# Patient Record
Sex: FEMALE | Race: WHITE | Employment: OTHER | URBAN - METROPOLITAN AREA
[De-identification: names, ages, dates, MRNs, and addresses within clinical notes are randomized per-mention and may not be internally consistent; named-entity substitution may affect disease eponyms.]

---

## 2017-02-14 ENCOUNTER — APPOINTMENT (OUTPATIENT)
Dept: INTERNAL MEDICINE CLINIC | Age: 67
End: 2017-02-14

## 2017-02-14 DIAGNOSIS — Z13.9 SCREENING: ICD-10-CM

## 2017-02-14 DIAGNOSIS — E03.9 HYPOTHYROIDISM, UNSPECIFIED TYPE: ICD-10-CM

## 2017-02-14 DIAGNOSIS — M85.80 OSTEOPENIA: ICD-10-CM

## 2017-02-14 DIAGNOSIS — I10 ESSENTIAL HYPERTENSION: ICD-10-CM

## 2017-02-15 LAB
25(OH)D3+25(OH)D2 SERPL-MCNC: 38.7 NG/ML (ref 30–100)
CHOLEST SERPL-MCNC: 161 MG/DL (ref 100–199)
HCV AB S/CO SERPL IA: <0.1 S/CO RATIO (ref 0–0.9)
HDLC SERPL-MCNC: 62 MG/DL
INTERPRETATION, 910389: NORMAL
LDLC SERPL CALC-MCNC: 86 MG/DL (ref 0–99)
TRIGL SERPL-MCNC: 67 MG/DL (ref 0–149)
TSH SERPL DL<=0.005 MIU/L-ACNC: 0.83 UIU/ML (ref 0.45–4.5)
VLDLC SERPL CALC-MCNC: 13 MG/DL (ref 5–40)

## 2017-02-21 ENCOUNTER — OFFICE VISIT (OUTPATIENT)
Dept: INTERNAL MEDICINE CLINIC | Age: 67
End: 2017-02-21

## 2017-02-21 VITALS
HEIGHT: 64 IN | SYSTOLIC BLOOD PRESSURE: 127 MMHG | DIASTOLIC BLOOD PRESSURE: 76 MMHG | WEIGHT: 180.8 LBS | BODY MASS INDEX: 30.87 KG/M2 | TEMPERATURE: 98 F | RESPIRATION RATE: 16 BRPM | OXYGEN SATURATION: 98 % | HEART RATE: 62 BPM

## 2017-02-21 DIAGNOSIS — I10 ESSENTIAL HYPERTENSION: ICD-10-CM

## 2017-02-21 DIAGNOSIS — Z01.419 WELL WOMAN EXAM: Primary | ICD-10-CM

## 2017-02-21 DIAGNOSIS — M85.80 OSTEOPENIA: ICD-10-CM

## 2017-02-21 DIAGNOSIS — Z13.9 SCREENING: ICD-10-CM

## 2017-02-21 DIAGNOSIS — Z71.89 ADVANCE DIRECTIVE DISCUSSED WITH PATIENT: ICD-10-CM

## 2017-02-21 DIAGNOSIS — E03.9 HYPOTHYROIDISM, UNSPECIFIED TYPE: ICD-10-CM

## 2017-02-21 RX ORDER — LEVOTHYROXINE SODIUM 150 UG/1
TABLET ORAL
Qty: 90 TAB | Refills: 4 | Status: SHIPPED | OUTPATIENT
Start: 2017-02-21 | End: 2018-04-03 | Stop reason: SDUPTHER

## 2017-02-21 NOTE — MR AVS SNAPSHOT
Visit Information Date & Time Provider Department Dept. Phone Encounter #  
 2/21/2017  9:00 AM Alyssa SotoElliot [phone] [id number] Follow-up Instructions Return in about 1 year (around 2/21/2018). Upcoming Health Maintenance Date Due  
 BREAST CANCER SCRN MAMMOGRAM 4/29/2016 PAP AKA CERVICAL CYTOLOGY 7/15/2016 DTaP/Tdap/Td series (2 - Td) 10/8/2018 COLONOSCOPY 1/22/2023 Allergies as of 2/21/2017  Review Complete On: 2/21/2017 By: Alyssa Soto MD  
  
 Severity Noted Reaction Type Reactions Pcn [Penicillins] High 11/17/2010    Hives Sulfa (Sulfonamide Antibiotics) High 11/17/2010    Hives Hydrochlorothiazide  11/17/2010    Other (comments) Fixed drug eruption Lisinopril  11/17/2010    Cough Sertraline  11/17/2010    Other (comments) \"racing thoughts and motivation\" Current Immunizations  Reviewed on 10/31/2013 Name Date Influenza Vaccine 10/15/2014, 10/26/2013 Influenza Vaccine Whole 11/17/2011 TDAP Vaccine 10/8/2008 Zoster Vaccine, Live 10/17/2012 Not reviewed this visit You Were Diagnosed With   
  
 Codes Comments Well woman exam    -  Primary ICD-10-CM: K60.420 ICD-9-CM: V72.31 Advance directive discussed with patient     ICD-10-CM: Z71.89 ICD-9-CM: V65.49 Hypothyroidism, unspecified type     ICD-10-CM: E03.9 ICD-9-CM: 244.9 Essential hypertension     ICD-10-CM: I10 
ICD-9-CM: 401.9 Osteopenia     ICD-10-CM: M85.80 ICD-9-CM: 733.90 Screening     ICD-10-CM: Z13.9 ICD-9-CM: V82.9 Vitals BP Pulse Temp Resp Height(growth percentile) Weight(growth percentile) 127/76 (BP 1 Location: Right arm, BP Patient Position: Sitting) 62 98 °F (36.7 °C) (Oral) 16 5' 4\" (1.626 m) 180 lb 12.8 oz (82 kg) SpO2 BMI OB Status Smoking Status 98% 31.03 kg/m2 Postmenopausal Never Smoker Vitals History BMI and BSA Data Body Mass Index Body Surface Area 31.03 kg/m 2 1.92 m 2 Preferred Pharmacy Pharmacy Name Phone [facility] [place] [place] [phone] Your Updated Medication List  
  
   
This list is accurate as of: 2/21/17  9:40 AM.  Always use your most recent med list.  
  
  
  
  
 chlorpheniramine 4 mg tablet Commonly known as:  CHLOR-TRIMETRON Take 4 mg by mouth every six (6) hours as needed for Allergies. diclofenac 1 % Gel Commonly known as:  VOLTAREN Apply 2 g to affected area four (4) times daily as needed for Pain. EPINEPHrine 0.3 mg/0.3 mL injection Commonly known as:  EPIPEN  
0.3 mL by IntraMUSCular route once as needed for up to 1 dose. levothyroxine 150 mcg tablet Commonly known as:  SYNTHROID  
TAKE ONE TABLET ONCE DAILY  BEFORE BREAKFAST ONE-A-DAY WOMENS FORMULA 27-0.4 mg Tab Generic drug:  multivitamins-ca-iron-minerals Take  by mouth. OYSTER SHELL CALCIUM-VIT D3 250-125 mg-unit tablet Generic drug:  calcium-vitamin D Take 1 Tab by mouth daily. VITAMIN D3 1,000 unit tablet Generic drug:  cholecalciferol Take 1,000 Units by mouth daily. Prescriptions Sent to Pharmacy Refills  
 levothyroxine (SYNTHROID) 150 mcg tablet 4 Sig: TAKE ONE TABLET ONCE DAILY  BEFORE BREAKFAST Class: Normal  
 Pharmacy: [address] [place] [address], [place] Rausch  #: [phone] We Performed the Following METABOLIC PANEL, COMPREHENSIVE [37874 CPT(R)] Follow-up Instructions Return in about 1 year (around 2/21/2018). To-Do List   
 02/21/2017 Imaging:  [facility] MAMMO BI SCREENING INCL CAD Introducing [facility]! Dear Leonard Gandara: 
Thank you for requesting a stiQRd account. Our records indicate that you already have an active stiQRd account. You can access your account anytime at [email or web address]. [email or web address] Did you know that you can access your hospital and ER discharge instructions at any time in Thing5? You can also review all of your test results from your hospital stay or ER visit. Additional Information If you have questions, please visit the Frequently Asked Questions section of the Thing5 website at [email or web address] Remember, Thing5 is NOT to be used for urgent needs. For medical emergencies, dial 911. Now available from your iPhone and Android! Please provide this summary of care documentation to your next provider. Your primary care clinician is listed as Vicky Gerber. If you have any questions after today's visit, please call [phone].

## 2017-02-21 NOTE — PROGRESS NOTES
Patient received paperwork for advance directive during previous visit but has not completed at this time     Reviewed record In preparation for visit and have obtained necessary documentation      1. Have you been to the ER, urgent care clinic since your last visit? Hospitalized since your last visit? NO    2. Have you seen or consulted any other health care providers outside of the [address] [place] [address] since your last visit? Include any pap smears or colon screening. NO      Pt reports she needs to schedule her pap and mammo. Reminded.

## 2017-02-21 NOTE — PROGRESS NOTES
HPI  Ms. Andra Gruber is a 61y.o. year old female, she is seen today for well exam, follow up hypothyroidism. Exercise - gym 3 days per week - aerobic and weights. Also gardening, also home renovations over the weekend. No cp or sob. Not working for about 6 mos. Enjoys being retired. No change in bowels, melena or brbpr. No changes noted on BSE. Chief Complaint   Patient presents with    Complete Physical        Prior to Admission medications    Medication Sig Start Date End Date Taking? Authorizing Provider   levothyroxine (SYNTHROID) 150 mcg tablet TAKE ONE TABLET ONCE DAILY  BEFORE BREAKFAST 2/21/17  Yes Becca Willson MD   diclofenac (VOLTAREN) 1 % topical gel Apply 2 g to affected area four (4) times daily as needed for Pain. 12/3/14  Yes Becca Willson MD   multivitamins-ca-iron-minerals (ONE-A-DAY WOMENS FORMULA) 27-0.4 mg Tab Take  by mouth. Yes Historical Provider   cholecalciferol, vitamin d3, (VITAMIN D) 1,000 unit tablet Take 1,000 Units by mouth daily. Yes Historical Provider   calcium-vitamin D (OYSTER SHELL CALCIUM-VIT D3) 250-125 mg-unit tablet Take 1 Tab by mouth daily. Yes Historical Provider   EPINEPHrine (EPIPEN) 0.3 mg/0.3 mL injection 0.3 mL by IntraMUSCular route once as needed for up to 1 dose. 7/25/16   Becca Willson MD   chlorpheniramine (CHLOR-TRIMETRON) 4 mg tablet Take 4 mg by mouth every six (6) hours as needed for Allergies.     Historical Provider         Allergies   Allergen Reactions    Pcn [Penicillins] Hives    Sulfa (Sulfonamide Antibiotics) Hives    Hydrochlorothiazide Other (comments)     Fixed drug eruption    Lisinopril Cough    Sertraline Other (comments)     \"racing thoughts and motivation\"         REVIEW OF SYSTEMS:  Per HPI    PHYSICAL EXAM:  Visit Vitals    /76 (BP 1 Location: Right arm, BP Patient Position: Sitting)    Pulse 62    Temp 98 °F (36.7 °C) (Oral)    Resp 16    Ht 5' 4\" (1.626 m)    Wt 180 lb 12.8 oz (82 kg)    SpO2 98%    BMI 31.03 kg/m2     Constitutional: Appears well-developed and well-nourished. No distress. HENT:   Head: Normocephalic and atraumatic. Eyes: No scleral icterus. Neck: no lad, no tm, supple   Cardiovascular: Normal S1/S2, regular rhythm. No murmurs, rubs, or gallops. Pulmonary/Chest: Effort normal and breath sounds normal. No respiratory distress. No wheezes, rhonchi, or rales. Breasts: symmetric , no masses, no skin changes, no axillary adenopathy   Abdomen: Soft, NT/ND, +BS, no rebound or guarding, no masses, no HSM appreciated. Ext: No edema. Neurological: Alert. Psychiatric: Normal mood and affect. Behavior is normal.     Lab Results   Component Value Date/Time    Sodium 144 02/21/2017 12:07 PM    Potassium 4.5 02/21/2017 12:07 PM    Chloride 104 02/21/2017 12:07 PM    CO2 26 02/21/2017 12:07 PM    Anion gap 10 04/08/2009 09:55 AM    Glucose 88 02/21/2017 12:07 PM    BUN 17 02/21/2017 12:07 PM    Creatinine 0.81 02/21/2017 12:07 PM    BUN/Creatinine ratio 21 02/21/2017 12:07 PM    GFR est AA 89 02/21/2017 12:07 PM    GFR est non-AA 77 02/21/2017 12:07 PM    Calcium 9.8 02/21/2017 12:07 PM    Bilirubin, total 0.3 02/21/2017 12:07 PM    AST (SGOT) 14 02/21/2017 12:07 PM    Alk.  phosphatase 72 02/21/2017 12:07 PM    Protein, total 6.9 02/21/2017 12:07 PM    Albumin 4.2 02/21/2017 12:07 PM    A-G Ratio 1.6 02/21/2017 12:07 PM    ALT (SGPT) 13 02/21/2017 12:07 PM     No results found for: HBA1C, HGBE8, XWX2DGQW, LLW5EDZF   Lab Results   Component Value Date/Time    Cholesterol, total 161 02/14/2017 08:12 AM    HDL Cholesterol 62 02/14/2017 08:12 AM    LDL, calculated 86 02/14/2017 08:12 AM    VLDL, calculated 13 02/14/2017 08:12 AM    Triglyceride 67 02/14/2017 08:12 AM    CHOL/HDL Ratio 2.4 04/08/2009 09:55 AM          ASSESSMENT/PLAN  Cedriclouie Richamacie was seen today for complete physical.    Diagnoses and all orders for this visit:    Well woman exam  -     [facility] MAMMO BI SCREENING INCL CAD; Future  Encouraged continued exercise, getting pap with gyn  Advance directive discussed with patient    Hypothyroidism, unspecified type  -     levothyroxine (SYNTHROID) 150 mcg tablet; TAKE ONE TABLET ONCE DAILY  BEFORE BREAKFAST  -     METABOLIC PANEL, COMPREHENSIVE  euthyroid  Essential hypertension  Controlled on current regimen, continue   Osteopenia    Screening          Health Maintenance Due   Topic Date Due    BREAST CANCER SCRN MAMMOGRAM  04/29/2016    PAP AKA CERVICAL CYTOLOGY  07/15/2016        Follow-up Disposition:  Return in about 1 year (around 2/21/2018). Reviewed plan of care. Patient has provided input and agrees with goals. The nurse provided the patient and/or family with advanced directive information if needed and encouraged the patient to provide a copy to the office when available.

## 2017-02-21 NOTE — ACP (ADVANCE CARE PLANNING)
Advance Care Planning (ACP) Provider Conversation Snapshot    Date of ACP Conversation: 02/21/17  Persons included in Conversation:  patient  Length of ACP Conversation in minutes:  <16 minutes (Non-Billable)    Authorized Decision Maker (if patient is incapable of making informed decisions): This person is:   Raina Yo -           For Patients with Decision Making Capacity:   Values/Goals: Exploration of values, goals, and preferences if recovery is not expected, even with continued medical treatment in the event of:  Imminent death  Severe, permanent brain injury  \"In these circumstances, what matters most to you? \"  Care focused more on comfort or quality of life.     Conversation Outcomes / Follow-Up Plan:   Recommended communicating the plan and making copies for the healthcare agent, personal physician, and others as appropriate (e.g., health system)

## 2017-02-22 LAB
ALBUMIN SERPL-MCNC: 4.2 G/DL (ref 3.6–4.8)
ALBUMIN/GLOB SERPL: 1.6 {RATIO} (ref 1.1–2.5)
ALP SERPL-CCNC: 72 IU/L (ref 39–117)
ALT SERPL-CCNC: 13 IU/L (ref 0–32)
AST SERPL-CCNC: 14 IU/L (ref 0–40)
BILIRUB SERPL-MCNC: 0.3 MG/DL (ref 0–1.2)
BUN SERPL-MCNC: 17 MG/DL (ref 8–27)
BUN/CREAT SERPL: 21 (ref 11–26)
CALCIUM SERPL-MCNC: 9.8 MG/DL (ref 8.7–10.3)
CHLORIDE SERPL-SCNC: 104 MMOL/L (ref 96–106)
CO2 SERPL-SCNC: 26 MMOL/L (ref 18–29)
CREAT SERPL-MCNC: 0.81 MG/DL (ref 0.57–1)
GLOBULIN SER CALC-MCNC: 2.7 G/DL (ref 1.5–4.5)
GLUCOSE SERPL-MCNC: 88 MG/DL (ref 65–99)
POTASSIUM SERPL-SCNC: 4.5 MMOL/L (ref 3.5–5.2)
PROT SERPL-MCNC: 6.9 G/DL (ref 6–8.5)
SODIUM SERPL-SCNC: 144 MMOL/L (ref 134–144)

## 2017-03-08 ENCOUNTER — OFFICE VISIT (OUTPATIENT)
Dept: DERMATOLOGY | Facility: AMBULATORY SURGERY CENTER | Age: 67
End: 2017-03-08

## 2017-03-08 ENCOUNTER — HOSPITAL ENCOUNTER (OUTPATIENT)
Dept: LAB | Age: 67
Discharge: HOME | End: 2017-03-08

## 2017-03-08 VITALS
BODY MASS INDEX: 30.73 KG/M2 | SYSTOLIC BLOOD PRESSURE: 128 MMHG | HEART RATE: 63 BPM | HEIGHT: 64 IN | TEMPERATURE: 98.3 F | WEIGHT: 180 LBS | DIASTOLIC BLOOD PRESSURE: 84 MMHG | RESPIRATION RATE: 18 BRPM | OXYGEN SATURATION: 97 %

## 2017-03-08 DIAGNOSIS — D18.01 CHERRY ANGIOMA: ICD-10-CM

## 2017-03-08 DIAGNOSIS — Q82.8 POROKERATOSIS: ICD-10-CM

## 2017-03-08 DIAGNOSIS — D22.9 MULTIPLE BENIGN NEVI: ICD-10-CM

## 2017-03-08 DIAGNOSIS — L82.1 SEBORRHEIC KERATOSES: ICD-10-CM

## 2017-03-08 DIAGNOSIS — Z80.8 FAMILY HISTORY OF NONMELANOMA SKIN CANCER: ICD-10-CM

## 2017-03-08 DIAGNOSIS — D48.5 NEOPLASM OF UNCERTAIN BEHAVIOR OF SKIN OF AXILLA: ICD-10-CM

## 2017-03-08 DIAGNOSIS — L91.8 CUTANEOUS SKIN TAGS: Primary | ICD-10-CM

## 2017-03-08 DIAGNOSIS — Z80.8 FAMILY HISTORY OF MELANOMA: ICD-10-CM

## 2017-03-08 NOTE — MR AVS SNAPSHOT
Visit Information Date & Time Provider Department Dept. Phone Encounter #  
 3/8/2017  1:15 PM Rachael Ramos NP [facility] [id number]  Your Appointments 3/8/2017  1:15 PM  
Any with Rachael Ramos NP [address] [phone] [facility]) Appt Note: est pt; Mole changed (dark) located under left arm. [facility] A Yvone Jessica [address] [address] Upcoming Health Maintenance Date Due  
 BREAST CANCER SCRN MAMMOGRAM 4/29/2016 PAP AKA CERVICAL CYTOLOGY 7/15/2016 DTaP/Tdap/Td series (2 - Td) 10/8/2018 COLONOSCOPY 1/22/2023 Allergies as of 3/8/2017  Review Complete On: 3/8/2017 By: Quin Cook LPN Severity Noted Reaction Type Reactions Pcn [Penicillins] High 11/17/2010    Hives Sulfa (Sulfonamide Antibiotics) High 11/17/2010    Hives Hydrochlorothiazide  11/17/2010    Other (comments) Fixed drug eruption Lisinopril  11/17/2010    Cough Sertraline  11/17/2010    Other (comments) \"racing thoughts and motivation\" Current Immunizations  Reviewed on 10/31/2013 Name Date Influenza Vaccine 10/15/2014, 10/26/2013 Influenza Vaccine Whole 11/17/2011 TDAP Vaccine 10/8/2008 Zoster Vaccine, Live 10/17/2012 Not reviewed this visit Vitals BP Pulse Temp Resp Height(growth percentile) Weight(growth percentile) 128/84 (BP 1 Location: Right arm, BP Patient Position: Sitting) 63 98.3 °F (36.8 °C) (Oral) 18 5' 4\" (1.626 m) 180 lb (81.6 kg) SpO2 BMI OB Status Smoking Status 97% 30.9 kg/m2 Postmenopausal Never Smoker BMI and BSA Data Body Mass Index Body Surface Area 30.9 kg/m 2 1.92 m 2 Preferred Pharmacy Pharmacy Name Phone [facility] [place] [place] [phone] Your Updated Medication List  
  
   
This list is accurate as of: 3/8/17  1:08 PM.  Always use your most recent med list.  
  
  
  
  
 chlorpheniramine 4 mg tablet Commonly known as:  CHLOR-TRIMETRON Take 4 mg by mouth every six (6) hours as needed for Allergies. diclofenac 1 % Gel Commonly known as:  VOLTAREN Apply 2 g to affected area four (4) times daily as needed for Pain. EPINEPHrine 0.3 mg/0.3 mL injection Commonly known as:  EPIPEN  
0.3 mL by IntraMUSCular route once as needed for up to 1 dose. levothyroxine 150 mcg tablet Commonly known as:  SYNTHROID  
TAKE ONE TABLET ONCE DAILY  BEFORE BREAKFAST ONE-A-DAY WOMENS FORMULA 27-0.4 mg Tab Generic drug:  multivitamins-ca-iron-minerals Take  by mouth. OYSTER SHELL CALCIUM-VIT D3 250-125 mg-unit tablet Generic drug:  calcium-vitamin D Take 1 Tab by mouth daily. VITAMIN D3 1,000 unit tablet Generic drug:  cholecalciferol Take 1,000 Units by mouth daily. To-Do List   
 03/20/2017 3:00 PM  
  [facility] with [facility] at [address] [place] [address] ([phone]) Shower or bathe using soap and water. Do not use deodorant, powder, perfumes, or lotion the day of your exam.  If your prior mammograms were not performed at [facility] please bring films with you or forward prior images 2 days before your procedure. Check in at registration 15min before your appointment time unless you were instructed to do otherwise. A script is not necessary, but if you have one, please bring it on the day of the mammogram or have it faxed to the department. [facility] [phone] [facility] [place]  [phone] [address] [place] [address]  [phone] [facility] [phone] [facility] 1[address] [facility] [phone] \[facility] [place] [facility]\"" & [facility] SERVICES! Dear Tosha Waldron: 
Thank you for requesting a StartSampling account. Our records indicate that you already have an active StartSampling account.   You can access your account anytime at [email or web address]/[employer] Did you know that you can access your hospital and ER discharge instructions at any time in [employer]? You can also review all of your test results from your hospital stay or ER visit. Additional Information If you have questions, please visit the Frequently Asked Questions section of the [employer] website at [email or web address] Remember, [employer] is NOT to be used for urgent needs. For medical emergencies, dial 911. Now available from your iPhone and Android! Please provide this summary of care documentation to your next provider. Your primary care clinician is listed as Vicky Gerber. If you have any questions after today's visit, please call [phone].
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
## 2017-03-20 ENCOUNTER — HOSPITAL ENCOUNTER (OUTPATIENT)
Dept: MAMMOGRAPHY | Age: 67
Discharge: HOME | End: 2017-03-20
Attending: INTERNAL MEDICINE
Payer: COMMERCIAL

## 2017-03-20 DIAGNOSIS — Z01.419 WELL WOMAN EXAM: ICD-10-CM

## 2017-03-20 PROCEDURE — 77067 SCR MAMMO BI INCL CAD: CPT

## 2017-08-07 ENCOUNTER — OFFICE VISIT (OUTPATIENT)
Dept: INTERNAL MEDICINE CLINIC | Age: 67
End: 2017-08-07

## 2017-08-07 VITALS
TEMPERATURE: 98.1 F | WEIGHT: 187.6 LBS | BODY MASS INDEX: 32.03 KG/M2 | HEIGHT: 64 IN | OXYGEN SATURATION: 97 % | SYSTOLIC BLOOD PRESSURE: 127 MMHG | DIASTOLIC BLOOD PRESSURE: 77 MMHG | HEART RATE: 60 BPM | RESPIRATION RATE: 16 BRPM

## 2017-08-07 DIAGNOSIS — M85.80 OSTEOPENIA, UNSPECIFIED LOCATION: ICD-10-CM

## 2017-08-07 DIAGNOSIS — E03.9 HYPOTHYROIDISM, UNSPECIFIED TYPE: ICD-10-CM

## 2017-08-07 DIAGNOSIS — H26.9 CATARACT OF BOTH EYES, UNSPECIFIED CATARACT TYPE: Primary | ICD-10-CM

## 2017-08-07 DIAGNOSIS — M85.9 DISORDER OF BONE DENSITY AND STRUCTURE, UNSPECIFIED: ICD-10-CM

## 2017-08-07 DIAGNOSIS — G47.33 OSA (OBSTRUCTIVE SLEEP APNEA): ICD-10-CM

## 2017-08-07 RX ORDER — KETOROLAC TROMETHAMINE 5 MG/ML
SOLUTION OPHTHALMIC
COMMUNITY
Start: 2017-07-26 | End: 2018-07-23 | Stop reason: ALTCHOICE

## 2017-08-07 RX ORDER — OFLOXACIN 3 MG/ML
SOLUTION/ DROPS OPHTHALMIC
COMMUNITY
Start: 2017-07-26 | End: 2018-07-23 | Stop reason: ALTCHOICE

## 2017-08-07 NOTE — PROGRESS NOTES
HPI  Ms. Joni Hameed is a 74y.o. year old female, she is seen today for pre op prior to cataract surgery on 8/9 and 8/16. In usual state of health. METS >4. No cp, sob, cough, no n/v/abd pain. No f/c.  Very active. Chief Complaint   Patient presents with    Pre-op Exam     Room 1// NON fasting // cataract surgery 8/9 and 8/16 w/ Dr Helen Renteria        Prior to Admission medications    Medication Sig Start Date End Date Taking? Authorizing Provider   ketorolac (ACULAR) 0.5 % ophthalmic solution  7/26/17  Yes Historical Provider   ofloxacin (FLOXIN) 0.3 % ophthalmic solution  7/26/17  Yes Historical Provider   levothyroxine (SYNTHROID) 150 mcg tablet TAKE ONE TABLET ONCE DAILY  BEFORE BREAKFAST 2/21/17  Yes Luis A Montenegro MD   chlorpheniramine (CHLOR-TRIMETRON) 4 mg tablet Take 4 mg by mouth every six (6) hours as needed for Allergies. Yes Historical Provider   diclofenac (VOLTAREN) 1 % topical gel Apply 2 g to affected area four (4) times daily as needed for Pain. 12/3/14  Yes Luis A Montenegro MD   multivitamins-ca-iron-minerals (ONE-A-DAY WOMENS FORMULA) 27-0.4 mg Tab Take  by mouth. Yes Historical Provider   cholecalciferol, vitamin d3, (VITAMIN D) 1,000 unit tablet Take 1,000 Units by mouth daily. Yes Historical Provider   calcium-vitamin D (OYSTER SHELL CALCIUM-VIT D3) 250-125 mg-unit tablet Take 1 Tab by mouth daily. Yes Historical Provider   EPINEPHrine (EPIPEN) 0.3 mg/0.3 mL injection 0.3 mL by IntraMUSCular route once as needed for up to 1 dose.  7/25/16   Luis A Montenegro MD         Allergies   Allergen Reactions    Pcn [Penicillins] Hives    Sulfa (Sulfonamide Antibiotics) Hives    Hydrochlorothiazide Other (comments)     Fixed drug eruption    Lisinopril Cough    Sertraline Other (comments)     \"racing thoughts and motivation\"         REVIEW OF SYSTEMS:  Per HPI    PHYSICAL EXAM:  Visit Vitals    /77 (BP 1 Location: Right arm, BP Patient Position: Sitting)    Pulse 60    Temp 98.1 °F (36.7 °C) (Oral)    Resp 16    Ht 5' 4\" (1.626 m)    Wt 187 lb 9.6 oz (85.1 kg)    LMP  (LMP Unknown)    SpO2 97%    BMI 32.2 kg/m2     Constitutional: Appears well-developed and well-nourished. No distress. HENT:   Head: Normocephalic and atraumatic. Eyes: No scleral icterus. PERRL  Ears: tm's wnl   Mouth: OP clear without lesions, no pharyngeal exudate  Neck: no lad, no tm, supple   Cardiovascular: Normal S1/S2, regular rhythm. No murmurs, rubs, or gallops. Pulmonary/Chest: Effort normal and breath sounds normal. No respiratory distress. No wheezes, rhonchi, or rales. Abdomen: Soft, NT/ND, +BS, no rebound or guarding, no masses, no HSM appreciated. Ext: No edema. Neurological: Alert. Psychiatric: Normal mood and affect. Behavior is normal.     Lab Results   Component Value Date/Time    Sodium 144 02/21/2017 12:07 PM    Potassium 4.5 02/21/2017 12:07 PM    Chloride 104 02/21/2017 12:07 PM    CO2 26 02/21/2017 12:07 PM    Anion gap 10 04/08/2009 09:55 AM    Glucose 88 02/21/2017 12:07 PM    BUN 17 02/21/2017 12:07 PM    Creatinine 0.81 02/21/2017 12:07 PM    BUN/Creatinine ratio 21 02/21/2017 12:07 PM    GFR est AA 89 02/21/2017 12:07 PM    GFR est non-AA 77 02/21/2017 12:07 PM    Calcium 9.8 02/21/2017 12:07 PM    Bilirubin, total 0.3 02/21/2017 12:07 PM    AST (SGOT) 14 02/21/2017 12:07 PM    Alk.  phosphatase 72 02/21/2017 12:07 PM    Protein, total 6.9 02/21/2017 12:07 PM    Albumin 4.2 02/21/2017 12:07 PM    A-G Ratio 1.6 02/21/2017 12:07 PM    ALT (SGPT) 13 02/21/2017 12:07 PM     No results found for: HBA1C, HGBE8, BCJ4ENGM, EUZ0OFGE   Lab Results   Component Value Date/Time    Cholesterol, total 161 02/14/2017 08:12 AM    HDL Cholesterol 62 02/14/2017 08:12 AM    LDL, calculated 86 02/14/2017 08:12 AM    VLDL, calculated 13 02/14/2017 08:12 AM    Triglyceride 67 02/14/2017 08:12 AM    CHOL/HDL Ratio 2.4 04/08/2009 09:55 AM          ASSESSMENT/PLAN  Diagnoses and all orders for this visit:      Based on ACC/AHA guidelines patient may proceed to OR for cataract surgery. 1. Cataract of both eyes, unspecified cataract type    2. Hypothyroidism, unspecified type    3. JEMIMA (obstructive sleep apnea)    4. Osteopenia, unspecified location    5. Disorder of bone density and structure, unspecified   -     DEXA BONE DENSITY STUDY AXIAL; Future          Health Maintenance Due   Topic Date Due    OSTEOPOROSIS SCREENING (DEXA)  07/04/2017    MEDICARE YEARLY EXAM  07/04/2017        Follow-up Disposition: Not on File       Reviewed plan of care. Patient has provided input and agrees with goals. The nurse provided the patient and/or family with advanced directive information if needed and encouraged the patient to provide a copy to the office when available.

## 2017-08-07 NOTE — PROGRESS NOTES
Jadiel Dangelo    Chief Complaint   Patient presents with    Pre-op Exam     Room 1// NON fasting // cataract surgery 8/9 and 8/16 w/ Dr Yuriy Choe       1. Have you been to the ER, urgent care clinic since your last visit? Hospitalized since your last visit? NO    2. Have you seen or consulted any other health care providers outside of the [address] [place] [address] since your last visit? Include any pap smears or colon screening.  NO      Dr Maldonado Savers notified of reason for visit and vitals        Patient received paperwork for advance directive during previous visit but has not completed at this time     Reviewed record In preparation for visit, huddled with provider and have obtained necessary documentation

## 2017-08-07 NOTE — MR AVS SNAPSHOT
Visit Information Date & Time Provider Department Dept. Phone Encounter #  
 8/7/2017  8:00 AM Madison Calhoun MD David Lomax [phone] [id number] Follow-up Instructions Return in about 6 months (around 2/7/2018) for AWV. Upcoming Health Maintenance Date Due OSTEOPOROSIS SCREENING (DEXA) 7/4/2017 MEDICARE YEARLY EXAM 7/4/2017 Pneumococcal 65+ Low/Medium Risk (2 of 2 - PPSV23) 8/7/2018 DTaP/Tdap/Td series (2 - Td) 10/8/2018 GLAUCOMA SCREENING Q2Y 12/9/2018 BREAST CANCER SCRN MAMMOGRAM 3/20/2019 COLONOSCOPY 1/22/2023 Allergies as of 8/7/2017  Review Complete On: 8/7/2017 By: Madison Calhoun MD  
  
 Severity Noted Reaction Type Reactions Pcn [Penicillins] High 11/17/2010    Hives Sulfa (Sulfonamide Antibiotics) High 11/17/2010    Hives Hydrochlorothiazide  11/17/2010    Other (comments) Fixed drug eruption Lisinopril  11/17/2010    Cough Sertraline  11/17/2010    Other (comments) \"racing thoughts and motivation\" Current Immunizations  Reviewed on 10/31/2013 Name Date Influenza Vaccine 10/15/2014, 10/26/2013 Influenza Vaccine Whole 11/17/2011 TDAP Vaccine 10/8/2008 Zoster Vaccine, Live 10/17/2012 Not reviewed this visit You Were Diagnosed With   
  
 Codes Comments Cataract of both eyes, unspecified cataract type    -  Primary ICD-10-CM: H26.9 ICD-9-CM: 366.9 Hypothyroidism, unspecified type     ICD-10-CM: E03.9 ICD-9-CM: 244.9 JEMIMA (obstructive sleep apnea)     ICD-10-CM: G47.33 
ICD-9-CM: 327.23 Osteopenia, unspecified location     ICD-10-CM: M85.80 ICD-9-CM: 733.90 Disorder of bone density and structure, unspecified     ICD-10-CM: M85.9 ICD-9-CM: 733.90 Vitals BP Pulse Temp Resp Height(growth percentile) Weight(growth percentile)  127/77 (BP 1 Location: Right arm, BP Patient Position: Sitting) 60 98.1 °F (36.7 °C) (Oral) 16 5' 4\" (1.626 m) 187 lb 9.6 oz (85.1 kg) LMP SpO2 BMI OB Status Smoking Status (LMP Unknown) 97% 32.2 kg/m2 Postmenopausal Never Smoker Vitals History BMI and BSA Data Body Mass Index Body Surface Area  
 32.2 kg/m 2 1.96 m 2 Preferred Pharmacy Pharmacy Name Phone [facility] - [facility] [phone] Your Updated Medication List  
  
   
This list is accurate as of: 8/7/17  8:26 AM.  Always use your most recent med list.  
  
  
  
  
 chlorpheniramine 4 mg tablet Commonly known as:  CHLOR-TRIMETRON Take 4 mg by mouth every six (6) hours as needed for Allergies. diclofenac 1 % Gel Commonly known as:  VOLTAREN Apply 2 g to affected area four (4) times daily as needed for Pain. EPINEPHrine 0.3 mg/0.3 mL injection Commonly known as:  EPIPEN  
0.3 mL by IntraMUSCular route once as needed for up to 1 dose. ketorolac 0.5 % ophthalmic solution Commonly known as:  ACULAR  
  
 levothyroxine 150 mcg tablet Commonly known as:  SYNTHROID  
TAKE ONE TABLET ONCE DAILY  BEFORE BREAKFAST  
  
 ofloxacin 0.3 % ophthalmic solution Commonly known as:  FLOXIN  
  
 ONE-A-DAY WOMENS FORMULA 27-0.4 mg Tab Generic drug:  multivitamins-ca-iron-minerals Take  by mouth. OYSTER SHELL CALCIUM-VIT D3 250-125 mg-unit tablet Generic drug:  calcium-vitamin D Take 1 Tab by mouth daily. VITAMIN D3 1,000 unit tablet Generic drug:  cholecalciferol Take 1,000 Units by mouth daily. Follow-up Instructions Return in about 6 months (around 2/7/2018) for AWV. To-Do List   
 08/31/2017 Imaging:  DEXA BONE DENSITY STUDY AXIAL Referral Information Referral ID Referred By Referred To  
  
 [id number] Lillie Funes Not Available Visits Status Start Date End Date 1 New Request 8/7/17 8/7/18 If your referral has a status of pending review or denied, additional information will be sent to support the outcome of this decision. Introducing [facility] SERVICES! Dear Aleksandr Stark: 
Thank you for requesting a NaviExpert account. Our records indicate that you already have an active NaviExpert account. You can access your account anytime at [email or web address] Did you know that you can access your hospital and ER discharge instructions at any time in NaviExpert? You can also review all of your test results from your hospital stay or ER visit. Additional Information If you have questions, please visit the Frequently Asked Questions section of the NaviExpert website at [email or web address] Remember, NaviExpert is NOT to be used for urgent needs. For medical emergencies, dial 911. Now available from your iPhone and Android! Please provide this summary of care documentation to your next provider. Your primary care clinician is listed as Vicky Gerber. If you have any questions after today's visit, please call [phone].

## 2017-08-25 ENCOUNTER — TELEPHONE (OUTPATIENT)
Dept: INTERNAL MEDICINE CLINIC | Age: 67
End: 2017-08-25

## 2017-08-25 DIAGNOSIS — M89.9 DISORDER OF BONE AND CARTILAGE: Primary | ICD-10-CM

## 2017-08-25 DIAGNOSIS — M94.9 DISORDER OF BONE AND CARTILAGE: Primary | ICD-10-CM

## 2017-08-25 NOTE — TELEPHONE ENCOUNTER
Macarena Nowak from [address] [place] [address] stated that diagnosis code for DEXA Scan does not work for [facility]. Can you please change the code, so that she can schedule patient. She stated there needs to be a brand new one entered.

## 2017-09-11 ENCOUNTER — HOSPITAL ENCOUNTER (OUTPATIENT)
Dept: MAMMOGRAPHY | Age: 67
Discharge: HOME | End: 2017-09-11
Attending: INTERNAL MEDICINE
Payer: MEDICARE

## 2017-09-11 DIAGNOSIS — M89.9 DISORDER OF BONE AND CARTILAGE: ICD-10-CM

## 2017-09-11 DIAGNOSIS — M94.9 DISORDER OF BONE AND CARTILAGE: ICD-10-CM

## 2017-09-11 PROCEDURE — 77080 DXA BONE DENSITY AXIAL: CPT

## 2018-04-03 DIAGNOSIS — E03.9 HYPOTHYROIDISM, UNSPECIFIED TYPE: ICD-10-CM

## 2018-04-03 RX ORDER — LEVOTHYROXINE SODIUM 150 UG/1
TABLET ORAL
Qty: 90 TAB | Refills: 0 | Status: SHIPPED | OUTPATIENT
Start: 2018-04-03 | End: 2018-07-09 | Stop reason: SDUPTHER

## 2018-04-18 ENCOUNTER — HOSPITAL ENCOUNTER (OUTPATIENT)
Dept: MAMMOGRAPHY | Age: 68
Discharge: HOME | End: 2018-04-18
Attending: INTERNAL MEDICINE
Payer: MEDICARE

## 2018-04-18 DIAGNOSIS — Z12.39 BREAST SCREENING: ICD-10-CM

## 2018-04-18 PROCEDURE — 77067 SCR MAMMO BI INCL CAD: CPT

## 2018-07-09 DIAGNOSIS — E03.9 HYPOTHYROIDISM, UNSPECIFIED TYPE: ICD-10-CM

## 2018-07-09 RX ORDER — LEVOTHYROXINE SODIUM 150 UG/1
TABLET ORAL
Qty: 90 TAB | Refills: 0 | Status: SHIPPED | OUTPATIENT
Start: 2018-07-09 | End: 2018-10-16 | Stop reason: SDUPTHER

## 2018-07-09 NOTE — TELEPHONE ENCOUNTER
Per Kamilaera:    Dr. Bear Crowder Refill  Received: Today       Jyoti MIRANDA [facility] Front Office                     Pt equested a refill of her Rx \"Levothyroxine 150mg\" to be sent to the [facility] on file. Best contact [phone].

## 2018-07-23 ENCOUNTER — OFFICE VISIT (OUTPATIENT)
Dept: INTERNAL MEDICINE CLINIC | Facility: CLINIC | Age: 68
End: 2018-07-23

## 2018-07-23 VITALS
HEIGHT: 64 IN | OXYGEN SATURATION: 96 % | SYSTOLIC BLOOD PRESSURE: 135 MMHG | DIASTOLIC BLOOD PRESSURE: 80 MMHG | TEMPERATURE: 98.1 F | WEIGHT: 181.8 LBS | RESPIRATION RATE: 16 BRPM | HEART RATE: 61 BPM | BODY MASS INDEX: 31.04 KG/M2

## 2018-07-23 DIAGNOSIS — Z71.89 ADVANCED DIRECTIVES, COUNSELING/DISCUSSION: ICD-10-CM

## 2018-07-23 DIAGNOSIS — Z13.39 SCREENING FOR ALCOHOLISM: ICD-10-CM

## 2018-07-23 DIAGNOSIS — E03.9 HYPOTHYROIDISM, UNSPECIFIED TYPE: ICD-10-CM

## 2018-07-23 DIAGNOSIS — Z00.00 MEDICARE ANNUAL WELLNESS VISIT, SUBSEQUENT: Primary | ICD-10-CM

## 2018-07-23 DIAGNOSIS — Z13.220 SCREENING FOR CHOLESTEROL LEVEL: ICD-10-CM

## 2018-07-23 DIAGNOSIS — Z13.31 SCREENING FOR DEPRESSION: ICD-10-CM

## 2018-07-23 NOTE — MR AVS SNAPSHOT
[address] [address] [phone] Patient: Stewart Bailon MRN: [id number] Visit Information Date & Time Provider Department Dept. Phone Encounter #  
 2018  2:30 PM Amadeo Liao MD [employer] [facility]  Follow-up Instructions Return in about 1 year (around 2019) for well exam, thyroid. Upcoming Health Maintenance Date Due Influenza Age 5 to Adult 2018 Pneumococcal 65+ Low/Medium Risk (2 of 2 - PPSV23) 2018 DTaP/Tdap/Td series (2 - Td) 10/8/2018 GLAUCOMA SCREENING Q2Y 2018 BREAST CANCER SCRN MAMMOGRAM 2020 COLONOSCOPY 2023 Allergies as of 2018  Review Complete On: 2018 By: Amadeo Liao MD  
  
 Severity Noted Reaction Type Reactions Pcn [Penicillins] High 2010    Hives Sulfa (Sulfonamide Antibiotics) High 2010    Hives Hydrochlorothiazide  2010    Other (comments) Fixed drug eruption Lisinopril  2010    Cough Sertraline  2010    Other (comments) \"racing thoughts and motivation\" Current Immunizations  Reviewed on 10/31/2013 Name Date Influenza Vaccine 10/15/2014, 10/26/2013 Influenza Vaccine Whole 2011 TDAP Vaccine 10/8/2008 Zoster Vaccine, Live 10/17/2012 Not reviewed this visit You Were Diagnosed With   
  
 Codes Comments Medicare annual wellness visit, subsequent    -  Primary ICD-10-CM: Z00.00 ICD-9-CM: V70.0 Hypothyroidism, unspecified type     ICD-10-CM: E03.9 ICD-9-CM: 244.9 Advanced directives, counseling/discussion     ICD-10-CM: Z71.89 ICD-9-CM: V65.49 Screening for alcoholism     ICD-10-CM: Z13.89 ICD-9-CM: V79.1 Screening for depression     ICD-10-CM: Z13.89 ICD-9-CM: V79.0 Screening for cholesterol level     ICD-10-CM: Z13.220 ICD-9-CM: V77.91 Vitals BP Pulse Temp Resp Height(growth percentile) Weight(growth percentile) 135/80 (BP 1 Location: Left arm, BP Patient Position: Sitting) 61 98.1 °F (36.7 °C) (Oral) 16 5' 4\" (1.626 m) 181 lb 12.8 oz (82.5 kg) LMP SpO2 BMI OB Status Smoking Status (LMP Unknown) 96% 31.21 kg/m2 Postmenopausal Never Smoker Vitals History BMI and BSA Data Body Mass Index Body Surface Area  
 31.21 kg/m 2 1.93 m 2 Preferred Pharmacy Pharmacy Name Phone [facility], M[facility] [place]ausch [phone] Your Updated Medication List  
  
   
This list is accurate as of 7/23/18  3:03 PM.  Always use your most recent med list.  
  
  
  
  
 chlorpheniramine 4 mg tablet Commonly known as:  CHLOR-TRIMETRON Take 4 mg by mouth every six (6) hours as needed for Allergies. diclofenac 1 % Gel Commonly known as:  VOLTAREN Apply 2 g to affected area four (4) times daily as needed for Pain. EPINEPHrine 0.3 mg/0.3 mL injection Commonly known as:  EPIPEN  
0.3 mL by IntraMUSCular route once as needed for up to 1 dose. levothyroxine 150 mcg tablet Commonly known as:  SYNTHROID  
TAKE ONE TABLET ONCE DAILY  BEFORE BREAKFAST ONE-A-DAY WOMENS FORMULA 27-0.4 mg Tab Generic drug:  multivitamins-ca-iron-minerals Take  by mouth. OYSTER SHELL CALCIUM-VIT D3 250-125 mg-unit tablet Generic drug:  calcium-vitamin D Take 1 Tab by mouth daily. VITAMIN D3 1,000 unit tablet Generic drug:  cholecalciferol Take 1,000 Units by mouth daily. We Performed the Following [address] [[id number] [facility]] LIPID PANEL [92773 CPT(R)] METABOLIC PANEL, COMPREHENSIVE [56194 CPT(R)] SC ANNUAL ALCOHOL SCREEN 15 MIN [id number] [facility]] TSH RFX ON ABNORMAL TO FREE T4 [[id number] Custom] Follow-up Instructions Return in about 1 year (around 7/23/2019) for well exam, thyroid. Patient Instructions Medicare Wellness Visit, Female The best way to live healthy is to have a lifestyle where you eat a well-balanced diet, exercise regularly, limit alcohol use, and quit all forms of tobacco/nicotine, if applicable. Regular preventive services are another way to keep healthy. Preventive services (vaccines, screening tests, monitoring & exams) can help personalize your care plan, which helps you manage your own care. Screening tests can find health problems at the earliest stages, when they are easiest to treat. [facility] follows the current, evidence-based guidelines published by the [facility] ([facility]) when recommending preventive services for our patients. Because we follow these guidelines, sometimes recommendations change over time as research supports it. (For example, mammograms used to be recommended annually. Even though Medicare will still pay for an annual mammogram, the newer guidelines recommend a mammogram every two years for women of average risk.) Of course, you and your provider may decide to screen more often for some diseases, based on your risk and co-morbidities (chronic disease you are already diagnosed with). Preventive services for you include: - Medicare offers their members a free annual wellness visit, which is time for you and your primary care provider to discuss and plan for your preventive service needs. Take advantage of this benefit every year! 
 
-All people over age 72 should receive the recommended pneumonia vaccines. Current USPSTF guidelines recommend a series of two vaccines for the best pneumonia protection.  
 
-All adults should have a yearly flu vaccine and a tetanus vaccine every 10 years.  All adults age 61 years should receive a shingles vaccine once in their lifetime.   
 
-A bone mass density test is recommended when a woman turns 65 to screen for osteoporosis. This test is only recommended once as a screening. Some providers will use this same test as a disease monitoring tool if you already have osteoporosis. -All adults age 38-68 years who are overweight should have a diabetes screening test once every three years.  
 
-Other screening tests & preventive services for persons with diabetes include: an eye exam to screen for diabetic retinopathy, a kidney function test, a foot exam, and stricter control over your cholesterol.  
 
-Cardiovascular screening for adults with routine risk involves an electrocardiogram (ECG) at intervals determined by the provider.  
 
-Colorectal cancer screenings should be done for adults age 54-65 years with normal risk. There are a number of acceptable methods of screening for this type of cancer. Each test has its own benefits and drawbacks. Discuss with your provider what is most appropriate for you during your annual wellness visit. The different tests include: colonoscopy (considered the best screening method), a fecal occult blood test, a fecal DNA test, and sigmoidoscopy. -Breast cancer screenings are recommended every other year for women of normal risk age 54-69 years.  
 
-Cervical cancer screenings for women over age 72 are only recommended with certain risk factors.  
 
-All adults born between [facility] should be screened once for Hepatitis C. Here is a list of your current Health Maintenance items (your personalized list of preventive services) with a due date: There are no preventive care reminders to display for this patient. Introducing [facility] SERVICES! Dear Leelee Charles: 
Thank you for requesting a InviteDEV account. Our records indicate that you already have an active InviteDEV account. You can access your account anytime at [email or web address] Did you know that you can access your hospital and ER discharge instructions at any time in [employer]? You can also review all of your test results from your hospital stay or ER visit. Additional Information If you have questions, please visit the Frequently Asked Questions section of the [employer] website at [email or web address] Remember, [employer] is NOT to be used for urgent needs. For medical emergencies, dial 911. Now available from your iPhone and Android! Please provide this summary of care documentation to your next provider. Your primary care clinician is listed as Vicky Gerber. If you have any questions after today's visit, please call [phone].

## 2018-07-23 NOTE — ACP (ADVANCE CARE PLANNING)
Advance Care Planning (ACP) Provider Conversation Snapshot    Date of ACP Conversation: 07/23/18  Persons included in Conversation:  patient  Length of ACP Conversation in minutes:  <16 minutes (Non-Billable)    Authorized Decision Maker (if patient is incapable of making informed decisions): This person is:   Devyn Swartz -           For Patients with Decision Making Capacity:   Values/Goals: Exploration of values, goals, and preferences if recovery is not expected, even with continued medical treatment in the event of:  Imminent death  Severe, permanent brain injury  \"In these circumstances, what matters most to you? \"  Care focused more on comfort or quality of life.     Conversation Outcomes / Follow-Up Plan:   Recommended completion of Advance Directive form after review of ACP materials and conversation with prospective healthcare agent

## 2018-07-23 NOTE — PROGRESS NOTES
Jada Wagoner  Identified pt with two pt identifiers(name and ). Chief Complaint   Patient presents with    [facility]     Room . Have you been to the ER, urgent care clinic since your last visit? Hospitalized since your last visit? NO    2. Have you seen or consulted any other health care providers outside of the [address] [place] [address] since your last visit? Include any pap smears or colon screening. NO      Provider notified of reason for visit, vitals and flowsheets obtained on patients. Patient received paperwork for advance directive during previous visit but has not completed at this time     Reviewed record In preparation for visit, huddled with provider and have obtained necessary documentation      There are no preventive care reminders to display for this patient.     Wt Readings from Last 3 Encounters:   18 181 lb 12.8 oz (82.5 kg)   17 187 lb 9.6 oz (85.1 kg)   17 180 lb (81.6 kg)     Temp Readings from Last 3 Encounters:   17 98.1 °F (36.7 °C) (Oral)   17 98.3 °F (36.8 °C) (Oral)   17 98 °F (36.7 °C) (Oral)     BP Readings from Last 3 Encounters:   17 127/77   17 128/84   17 127/76     Pulse Readings from Last 3 Encounters:   17 60   17 63   17 62     Vitals:    18 1428   Resp: 16   Weight: 181 lb 12.8 oz (82.5 kg)   Height: 5' 4\" (1.626 m)         Learning Assessment:  :     Learning Assessment 3/8/2017 3/14/2016 12/3/2014   PRIMARY LEARNER Patient Patient Patient   HIGHEST LEVEL OF EDUCATION - PRIMARY LEARNER  - - > 4 YEARS OF COLLEGE   BARRIERS PRIMARY LEARNER NONE - NONE   CO-LEARNER CAREGIVER No - -   PRIMARY LANGUAGE ENGLISH ENGLISH ENGLISH    NEED - - No   LEARNER PREFERENCE PRIMARY DEMONSTRATION LISTENING DEMONSTRATION   LEARNING SPECIAL TOPICS no no -   ANSWERED BY patient patient patient   RELATIONSHIP SELF SELF SELF   ASSESSMENT COMMENT none none -       Depression Screening:  :     PHQ over the last two weeks 2/21/2017   Little interest or pleasure in doing things Not at all   Feeling down, depressed, irritable, or hopeless Not at all   Total Score PHQ 2 0       Fall Risk Assessment:  :     Fall Risk Assessment, last 12 mths 2/21/2017   Able to walk? Yes   Fall in past 12 months? No       Abuse Screening:  :     Abuse Screening Questionnaire 2/21/2017 2/16/2016   Do you ever feel afraid of your partner? N N   Are you in a relationship with someone who physically or mentally threatens you? N N   Is it safe for you to go home? Y Y       ADL Screening:  :     ADL Assessment 2/21/2017   Feeding yourself No Help Needed   Getting from bed to chair No Help Needed   Getting dressed No Help Needed   Bathing or showering No Help Needed   Walk across the room (includes cane/walker) No Help Needed   Using the telphone No Help Needed   Taking your medications No Help Needed   Preparing meals No Help Needed   Managing money (expenses/bills) No Help Needed   Moderately strenuous housework (laundry) No Help Needed   Shopping for personal items (toiletries/medicines) No Help Needed   Shopping for groceries No Help Needed   Driving No Help Needed   Climbing a flight of stairs No Help Needed   Getting to places beyond walking distances No Help Needed         Medication reconciliation up to date and corrected with patient at this time.
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
## 2018-07-23 NOTE — PATIENT INSTRUCTIONS
Medicare Wellness Visit, Female    The best way to live healthy is to have a lifestyle where you eat a well-balanced diet, exercise regularly, limit alcohol use, and quit all forms of tobacco/nicotine, if applicable. Regular preventive services are another way to keep healthy. Preventive services (vaccines, screening tests, monitoring & exams) can help personalize your care plan, which helps you manage your own care. Screening tests can find health problems at the earliest stages, when they are easiest to treat. [facility] follows the current, evidence-based guidelines published by the [facility] Juju ([facility]) when recommending preventive services for our patients. Because we follow these guidelines, sometimes recommendations change over time as research supports it. (For example, mammograms used to be recommended annually. Even though Medicare will still pay for an annual mammogram, the newer guidelines recommend a mammogram every two years for women of average risk.)    Of course, you and your provider may decide to screen more often for some diseases, based on your risk and co-morbidities (chronic disease you are already diagnosed with). Preventive services for you include:    - Medicare offers their members a free annual wellness visit, which is time for you and your primary care provider to discuss and plan for your preventive service needs. Take advantage of this benefit every year!    -All people over age 72 should receive the recommended pneumonia vaccines. Current USPSTF guidelines recommend a series of two vaccines for the best pneumonia protection.     -All adults should have a yearly flu vaccine and a tetanus vaccine every 10 years. All adults age 61 years should receive a shingles vaccine once in their lifetime.      -A bone mass density test is recommended when a woman turns 65 to screen for osteoporosis.  This test is only recommended once as a screening. Some providers will use this same test as a disease monitoring tool if you already have osteoporosis. -All adults age 38-68 years who are overweight should have a diabetes screening test once every three years.     -Other screening tests & preventive services for persons with diabetes include: an eye exam to screen for diabetic retinopathy, a kidney function test, a foot exam, and stricter control over your cholesterol.     -Cardiovascular screening for adults with routine risk involves an electrocardiogram (ECG) at intervals determined by the provider.     -Colorectal cancer screenings should be done for adults age 54-65 years with normal risk. There are a number of acceptable methods of screening for this type of cancer. Each test has its own benefits and drawbacks. Discuss with your provider what is most appropriate for you during your annual wellness visit. The different tests include: colonoscopy (considered the best screening method), a fecal occult blood test, a fecal DNA test, and sigmoidoscopy. -Breast cancer screenings are recommended every other year for women of normal risk age 54-69 years.     -Cervical cancer screenings for women over age 72 are only recommended with certain risk factors.     -All adults born between [facility] should be screened once for Hepatitis C. Here is a list of your current Health Maintenance items (your personalized list of preventive services) with a due date: There are no preventive care reminders to display for this patient.

## 2018-07-26 ENCOUNTER — APPOINTMENT (OUTPATIENT)
Dept: INTERNAL MEDICINE CLINIC | Facility: CLINIC | Age: 68
End: 2018-07-26

## 2018-07-27 LAB
ALBUMIN SERPL-MCNC: 4.2 G/DL (ref 3.6–4.8)
ALBUMIN/GLOB SERPL: 1.7 {RATIO} (ref 1.2–2.2)
ALP SERPL-CCNC: 67 IU/L (ref 39–117)
ALT SERPL-CCNC: 15 IU/L (ref 0–32)
AST SERPL-CCNC: 16 IU/L (ref 0–40)
BILIRUB SERPL-MCNC: 0.3 MG/DL (ref 0–1.2)
BUN SERPL-MCNC: 20 MG/DL (ref 8–27)
BUN/CREAT SERPL: 28 (ref 12–28)
CALCIUM SERPL-MCNC: 9.3 MG/DL (ref 8.7–10.3)
CHLORIDE SERPL-SCNC: 105 MMOL/L (ref 96–106)
CHOLEST SERPL-MCNC: 151 MG/DL (ref 100–199)
CO2 SERPL-SCNC: 26 MMOL/L (ref 20–29)
CREAT SERPL-MCNC: 0.71 MG/DL (ref 0.57–1)
GLOBULIN SER CALC-MCNC: 2.5 G/DL (ref 1.5–4.5)
GLUCOSE SERPL-MCNC: 92 MG/DL (ref 65–99)
HDLC SERPL-MCNC: 66 MG/DL
LDLC SERPL CALC-MCNC: 73 MG/DL (ref 0–99)
POTASSIUM SERPL-SCNC: 4.4 MMOL/L (ref 3.5–5.2)
PROT SERPL-MCNC: 6.7 G/DL (ref 6–8.5)
SODIUM SERPL-SCNC: 143 MMOL/L (ref 134–144)
TRIGL SERPL-MCNC: 60 MG/DL (ref 0–149)
TSH SERPL DL<=0.005 MIU/L-ACNC: 3.33 UIU/ML (ref 0.45–4.5)
VLDLC SERPL CALC-MCNC: 12 MG/DL (ref 5–40)

## 2018-10-16 DIAGNOSIS — E03.9 HYPOTHYROIDISM, UNSPECIFIED TYPE: ICD-10-CM

## 2018-10-16 RX ORDER — LEVOTHYROXINE SODIUM 150 UG/1
TABLET ORAL
Qty: 90 TAB | Refills: 2 | Status: SHIPPED | OUTPATIENT
Start: 2018-10-16 | End: 2019-07-19 | Stop reason: SDUPTHER

## 2019-05-16 ENCOUNTER — HOSPITAL ENCOUNTER (OUTPATIENT)
Dept: LAB | Age: 69
Discharge: HOME | End: 2019-05-16

## 2019-05-16 ENCOUNTER — OFFICE VISIT (OUTPATIENT)
Dept: DERMATOLOGY | Facility: AMBULATORY SURGERY CENTER | Age: 69
End: 2019-05-16

## 2019-05-16 VITALS
SYSTOLIC BLOOD PRESSURE: 142 MMHG | HEART RATE: 78 BPM | HEIGHT: 64 IN | RESPIRATION RATE: 15 BRPM | TEMPERATURE: 98 F | OXYGEN SATURATION: 98 % | BODY MASS INDEX: 30.9 KG/M2 | WEIGHT: 181 LBS | DIASTOLIC BLOOD PRESSURE: 88 MMHG

## 2019-05-16 DIAGNOSIS — D48.5 NEOPLASM OF UNCERTAIN BEHAVIOR OF SKIN OF BACK: Primary | ICD-10-CM

## 2019-05-16 DIAGNOSIS — L82.1 SEBORRHEIC KERATOSES: ICD-10-CM
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
## 2019-07-11 ENCOUNTER — HOSPITAL ENCOUNTER (OUTPATIENT)
Dept: MAMMOGRAPHY | Age: 69
Discharge: HOME | End: 2019-07-11
Attending: INTERNAL MEDICINE
Payer: MEDICARE

## 2019-07-11 DIAGNOSIS — Z12.39 SCREENING BREAST EXAMINATION: ICD-10-CM

## 2019-07-11 PROCEDURE — 77067 SCR MAMMO BI INCL CAD: CPT

## 2019-07-19 ENCOUNTER — OFFICE VISIT (OUTPATIENT)
Dept: INTERNAL MEDICINE CLINIC | Facility: CLINIC | Age: 69
End: 2019-07-19

## 2019-07-19 VITALS
WEIGHT: 187.6 LBS | TEMPERATURE: 97.8 F | SYSTOLIC BLOOD PRESSURE: 148 MMHG | DIASTOLIC BLOOD PRESSURE: 90 MMHG | BODY MASS INDEX: 32.03 KG/M2 | HEART RATE: 62 BPM | HEIGHT: 64 IN | RESPIRATION RATE: 14 BRPM | OXYGEN SATURATION: 95 %

## 2019-07-19 DIAGNOSIS — M85.9 DISORDER OF BONE DENSITY AND STRUCTURE, UNSPECIFIED: ICD-10-CM

## 2019-07-19 DIAGNOSIS — R03.0 WHITE COAT SYNDROME WITHOUT DIAGNOSIS OF HYPERTENSION: ICD-10-CM

## 2019-07-19 DIAGNOSIS — E03.9 HYPOTHYROIDISM, UNSPECIFIED TYPE: ICD-10-CM

## 2019-07-19 DIAGNOSIS — E03.9 ACQUIRED HYPOTHYROIDISM: ICD-10-CM

## 2019-07-19 DIAGNOSIS — Z13.31 SCREENING FOR DEPRESSION: ICD-10-CM

## 2019-07-19 DIAGNOSIS — Z71.89 ADVANCED DIRECTIVES, COUNSELING/DISCUSSION: ICD-10-CM

## 2019-07-19 DIAGNOSIS — Z00.00 MEDICARE ANNUAL WELLNESS VISIT, SUBSEQUENT: Primary | ICD-10-CM

## 2019-07-19 DIAGNOSIS — M81.0 AGE-RELATED OSTEOPOROSIS WITHOUT CURRENT PATHOLOGICAL FRACTURE: ICD-10-CM

## 2019-07-19 RX ORDER — LEVOTHYROXINE SODIUM 150 UG/1
TABLET ORAL
Qty: 90 TAB | Refills: 4 | Status: SHIPPED | OUTPATIENT
Start: 2019-07-19 | End: 2020-10-20
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
## 2019-07-19 NOTE — PATIENT INSTRUCTIONS
Medicare Wellness Visit, Female     The best way to live healthy is to have a lifestyle where you eat a well-balanced diet, exercise regularly, limit alcohol use, and quit all forms of tobacco/nicotine, if applicable. Regular preventive services are another way to keep healthy. Preventive services (vaccines, screening tests, monitoring & exams) can help personalize your care plan, which helps you manage your own care. Screening tests can find health problems at the earliest stages, when they are easiest to treat. [facility] follows the current, evidence-based guidelines published by the [facility] Juju ([facility]) when recommending preventive services for our patients. Because we follow these guidelines, sometimes recommendations change over time as research supports it. (For example, mammograms used to be recommended annually. Even though Medicare will still pay for an annual mammogram, the newer guidelines recommend a mammogram every two years for women of average risk.)  Of course, you and your doctor may decide to screen more often for some diseases, based on your risk and your health status. Preventive services for you include:  - Medicare offers their members a free annual wellness visit, which is time for you and your primary care provider to discuss and plan for your preventive service needs. Take advantage of this benefit every year!  -All adults over the age of 72 should receive the recommended pneumonia vaccines. Current USPSTF guidelines recommend a series of two vaccines for the best pneumonia protection.   -All adults should have a flu vaccine yearly and a tetanus vaccine every 10 years. All adults age 61 and older should receive a shingles vaccine once in their lifetime.    -A bone mass density test is recommended when a woman turns 65 to screen for osteoporosis. This test is only recommended one time, as a screening.  Some providers will use this same test as a disease monitoring tool if you already have osteoporosis. -All adults age 38-68 who are overweight should have a diabetes screening test once every three years.   -Other screening tests and preventive services for persons with diabetes include: an eye exam to screen for diabetic retinopathy, a kidney function test, a foot exam, and stricter control over your cholesterol.   -Cardiovascular screening for adults with routine risk involves an electrocardiogram (ECG) at intervals determined by your doctor.   -Colorectal cancer screenings should be done for adults age 54-65 with no increased risk factors for colorectal cancer. There are a number of acceptable methods of screening for this type of cancer. Each test has its own benefits and drawbacks. Discuss with your doctor what is most appropriate for you during your annual wellness visit. The different tests include: colonoscopy (considered the best screening method), a fecal occult blood test, a fecal DNA test, and sigmoidoscopy. -Breast cancer screenings are recommended every other year for women of normal risk, age 54-69.  -Cervical cancer screenings for women over age 72 are only recommended with certain risk factors.   -All adults born between [facility] should be screened once for Hepatitis C.      Here is a list of your current Health Maintenance items (your personalized list of preventive services) with a due date:  Health Maintenance Due   Topic Date Due    Pneumococcal Vaccine (1 of 2 - PCV13) 07/04/2017    DTaP/Tdap/Td  (2 - Td) 10/08/2018    Annual Well Visit  07/24/2019

## 2019-07-19 NOTE — PROGRESS NOTES
Twan Donald  Identified pt with two pt identifiers(name and ). Chief Complaint   Patient presents with   Darby Brandon Annual Wellness Visit     Room 2A//     Thyroid Problem       1. Have you been to the ER, urgent care clinic since your last visit? Hospitalized since your last visit? NO    2. Have you seen or consulted any other health care providers outside of the [address] [place] [address] since your last visit? Include any pap smears or colon screening. NO      Provider notified of reason for visit, vitals and flowsheets obtained on patients. Patient received paperwork for advance directive during previous visit but has not completed at this time     Reviewed record In preparation for visit, huddled with provider and have obtained necessary documentation      Health Maintenance Due   Topic    Pneumococcal 65+ years (1 of 2 - PCV13)    DTaP/Tdap/Td series (2 - Td)    GLAUCOMA SCREENING Q2Y     MEDICARE YEARLY EXAM        Wt Readings from Last 3 Encounters:   19 181 lb (82.1 kg)   18 181 lb 12.8 oz (82.5 kg)   17 187 lb 9.6 oz (85.1 kg)     Temp Readings from Last 3 Encounters:   19 98 °F (36.7 °C) (Oral)   18 98.1 °F (36.7 °C) (Oral)   17 98.1 °F (36.7 °C) (Oral)     BP Readings from Last 3 Encounters:   19 142/88   18 135/80   17 127/77     Pulse Readings from Last 3 Encounters:   19 78   18 61   17 60     There were no vitals filed for this visit.       Learning Assessment:  :     Learning Assessment 3/8/2017 3/14/2016 12/3/2014   PRIMARY LEARNER Patient Patient Patient   HIGHEST LEVEL OF EDUCATION - PRIMARY LEARNER  - - > 4 YEARS OF COLLEGE   BARRIERS PRIMARY LEARNER NONE - NONE   CO-LEARNER CAREGIVER No - -   PRIMARY LANGUAGE ENGLISH ENGLISH ENGLISH    NEED - - No   LEARNER PREFERENCE PRIMARY DEMONSTRATION LISTENING DEMONSTRATION   LEARNING SPECIAL TOPICS no no -   ANSWERED BY patient patient patient   RELATIONSHIP SELF SELF SELF   ASSESSMENT COMMENT none none -       Depression Screening:  :     3 most recent PHQ Screens 7/19/2019   Little interest or pleasure in doing things Not at all   Feeling down, depressed, irritable, or hopeless Not at all   Total Score PHQ 2 0       Fall Risk Assessment:  :     Fall Risk Assessment, last 12 mths 7/19/2019   Able to walk? Yes   Fall in past 12 months? No       Abuse Screening:  :     Abuse Screening Questionnaire 7/19/2019 7/23/2018 2/21/2017 2/16/2016   Do you ever feel afraid of your partner? N N N N   Are you in a relationship with someone who physically or mentally threatens you? N N N N   Is it safe for you to go home? Y Y Y Y       ADL Screening:  :     ADL Assessment 7/23/2018   Feeding yourself No Help Needed   Getting from bed to chair No Help Needed   Getting dressed No Help Needed   Bathing or showering No Help Needed   Walk across the room (includes cane/walker) No Help Needed   Using the telphone No Help Needed   Taking your medications No Help Needed   Preparing meals No Help Needed   Managing money (expenses/bills) No Help Needed   Moderately strenuous housework (laundry) No Help Needed   Shopping for personal items (toiletries/medicines) No Help Needed   Shopping for groceries No Help Needed   Driving No Help Needed   Climbing a flight of stairs No Help Needed   Getting to places beyond walking distances No Help Needed         Medication reconciliation up to date and corrected with patient at this time.

## 2019-07-20 LAB
25(OH)D3+25(OH)D2 SERPL-MCNC: 41.9 NG/ML (ref 30–100)
ALBUMIN SERPL-MCNC: 4.4 G/DL (ref 3.6–4.8)
ALBUMIN/GLOB SERPL: 1.6 {RATIO} (ref 1.2–2.2)
ALP SERPL-CCNC: 76 IU/L (ref 39–117)
ALT SERPL-CCNC: 16 IU/L (ref 0–32)
AST SERPL-CCNC: 13 IU/L (ref 0–40)
BILIRUB SERPL-MCNC: 0.3 MG/DL (ref 0–1.2)
BUN SERPL-MCNC: 17 MG/DL (ref 8–27)
BUN/CREAT SERPL: 20 (ref 12–28)
CALCIUM SERPL-MCNC: 10.1 MG/DL (ref 8.7–10.3)
CHLORIDE SERPL-SCNC: 103 MMOL/L (ref 96–106)
CO2 SERPL-SCNC: 25 MMOL/L (ref 20–29)
CREAT SERPL-MCNC: 0.83 MG/DL (ref 0.57–1)
GLOBULIN SER CALC-MCNC: 2.7 G/DL (ref 1.5–4.5)
GLUCOSE SERPL-MCNC: 99 MG/DL (ref 65–99)
POTASSIUM SERPL-SCNC: 4.4 MMOL/L (ref 3.5–5.2)
PROT SERPL-MCNC: 7.1 G/DL (ref 6–8.5)
SODIUM SERPL-SCNC: 141 MMOL/L (ref 134–144)
TSH SERPL DL<=0.005 MIU/L-ACNC: 2.25 UIU/ML (ref 0.45–4.5)

## 2019-09-12 ENCOUNTER — HOSPITAL ENCOUNTER (OUTPATIENT)
Dept: MAMMOGRAPHY | Age: 69
Discharge: HOME | End: 2019-09-12
Attending: INTERNAL MEDICINE
Payer: MEDICARE

## 2019-09-12 DIAGNOSIS — M81.0 AGE-RELATED OSTEOPOROSIS WITHOUT CURRENT PATHOLOGICAL FRACTURE: ICD-10-CM

## 2019-09-12 PROCEDURE — 77080 DXA BONE DENSITY AXIAL: CPT

## 2019-09-16 DIAGNOSIS — M81.0 AGE-RELATED OSTEOPOROSIS WITHOUT CURRENT PATHOLOGICAL FRACTURE: ICD-10-CM

## 2019-09-16 RX ORDER — ALENDRONATE SODIUM 70 MG/1
70 TABLET ORAL
Qty: 12 TAB | Refills: 4 | Status: SHIPPED | OUTPATIENT
Start: 2019-09-16 | End: 2020-11-02 | Stop reason: SDUPTHER

## 2019-09-16 NOTE — PROGRESS NOTES
This shows osteoporosis with some improvement in some areas and others worsening - I will send a prescription for alendronate as we discussed to treat osteoporosis

## 2020-11-02 ENCOUNTER — OFFICE VISIT (OUTPATIENT)
Dept: INTERNAL MEDICINE CLINIC | Age: 70
End: 2020-11-02
Payer: MEDICARE

## 2020-11-02 VITALS
DIASTOLIC BLOOD PRESSURE: 80 MMHG | TEMPERATURE: 98.2 F | SYSTOLIC BLOOD PRESSURE: 132 MMHG | HEART RATE: 63 BPM | HEIGHT: 64 IN | BODY MASS INDEX: 32.78 KG/M2 | WEIGHT: 192 LBS | RESPIRATION RATE: 14 BRPM | OXYGEN SATURATION: 95 %

## 2020-11-02 DIAGNOSIS — F41.9 ANXIETY: ICD-10-CM

## 2020-11-02 DIAGNOSIS — Z13.31 SCREENING FOR DEPRESSION: ICD-10-CM

## 2020-11-02 DIAGNOSIS — E03.9 ACQUIRED HYPOTHYROIDISM: ICD-10-CM

## 2020-11-02 DIAGNOSIS — Z00.00 MEDICARE ANNUAL WELLNESS VISIT, SUBSEQUENT: Primary | ICD-10-CM

## 2020-11-02 DIAGNOSIS — M81.0 AGE-RELATED OSTEOPOROSIS WITHOUT CURRENT PATHOLOGICAL FRACTURE: ICD-10-CM

## 2020-11-02 PROCEDURE — G8536 NO DOC ELDER MAL SCRN: HCPCS | Performed by: INTERNAL MEDICINE

## 2020-11-02 PROCEDURE — G0439 PPPS, SUBSEQ VISIT: HCPCS | Performed by: INTERNAL MEDICINE

## 2020-11-02 PROCEDURE — 1100F PTFALLS ASSESS-DOCD GE2>/YR: CPT | Performed by: INTERNAL MEDICINE

## 2020-11-02 PROCEDURE — G8427 DOCREV CUR MEDS BY ELIG CLIN: HCPCS | Performed by: INTERNAL MEDICINE

## 2020-11-02 PROCEDURE — 1090F PRES/ABSN URINE INCON ASSESS: CPT | Performed by: INTERNAL MEDICINE

## 2020-11-02 PROCEDURE — G8417 CALC BMI ABV UP PARAM F/U: HCPCS | Performed by: INTERNAL MEDICINE

## 2020-11-02 PROCEDURE — 3017F COLORECTAL CA SCREEN DOC REV: CPT | Performed by: INTERNAL MEDICINE

## 2020-11-02 PROCEDURE — 99214 OFFICE O/P EST MOD 30 MIN: CPT | Performed by: INTERNAL MEDICINE

## 2020-11-02 PROCEDURE — G9899 SCRN MAM PERF RSLTS DOC: HCPCS | Performed by: INTERNAL MEDICINE

## 2020-11-02 PROCEDURE — 3288F FALL RISK ASSESSMENT DOCD: CPT | Performed by: INTERNAL MEDICINE

## 2020-11-02 PROCEDURE — G8510 SCR DEP NEG, NO PLAN REQD: HCPCS | Performed by: INTERNAL MEDICINE

## 2020-11-02 RX ORDER — ALENDRONATE SODIUM 70 MG/1
70 TABLET ORAL
Qty: 12 TAB | Refills: 4 | Status: SHIPPED | OUTPATIENT
Start: 2020-11-02

## 2020-11-02 NOTE — PROGRESS NOTES
Thedore Found  Identified pt with two pt identifiers(name and ). Chief Complaint   Patient presents with    Blood Pressure Check     Room 4C //     Thyroid Problem    Annual Wellness Visit       Reviewed record In preparation for visit and have obtained necessary documentation. 1. Have you been to the ER, urgent care clinic or hospitalized since your last visit? No     2. Have you seen or consulted any other health care providers outside of the [address] [place] [address] since your last visit? Include any pap smears or colon screening. No    Patient does not have an advance directive. Vitals reviewed with provider.     Health Maintenance reviewed:     Health Maintenance Due   Topic    Shingrix Vaccine Age 49> (1 of 2)    Pneumococcal 65+ years (1 of 1 - PPSV23)    DTaP/Tdap/Td series (2 - Td)    Medicare Yearly Exam           Wt Readings from Last 3 Encounters:   20 192 lb (87.1 kg)   19 187 lb 9.6 oz (85.1 kg)   19 181 lb (82.1 kg)        Temp Readings from Last 3 Encounters:   20 98.2 °F (36.8 °C) (Oral)   19 97.8 °F (36.6 °C) (Oral)   19 98 °F (36.7 °C) (Oral)        BP Readings from Last 3 Encounters:   20 (!) 164/89   19 148/90   19 142/88        Pulse Readings from Last 3 Encounters:   20 63   19 62   19 78        Vitals:    20 0957   BP: (!) 164/89   Pulse: 63   Resp: 14   Temp: 98.2 °F (36.8 °C)   TempSrc: Oral   SpO2: 95%   Weight: 192 lb (87.1 kg)   Height: 5' 4\" (1.626 m)   PainSc:   0 - No pain          Learning Assessment:   :       Learning Assessment 3/8/2017 3/14/2016 12/3/2014   PRIMARY LEARNER Patient Patient Patient   HIGHEST LEVEL OF EDUCATION - PRIMARY LEARNER  - - > 4 YEARS OF COLLEGE   BARRIERS PRIMARY LEARNER NONE - NONE   CO-LEARNER CAREGIVER No - -   PRIMARY LANGUAGE ENGLISH ENGLISH ENGLISH    NEED - - No   LEARNER PREFERENCE PRIMARY DEMONSTRATION LISTENING DEMONSTRATION LEARNING SPECIAL TOPICS no no -   ANSWERED BY patient patient patient   RELATIONSHIP SELF SELF SELF   ASSESSMENT COMMENT none none -        Depression Screening:   :       3 most recent PHQ Screens 11/2/2020   Little interest or pleasure in doing things Not at all   Feeling down, depressed, irritable, or hopeless Not at all   Total Score PHQ 2 0        Fall Risk Assessment:   :       Fall Risk Assessment, last 12 mths 11/2/2020   Able to walk? Yes   Fall in past 12 months? Yes   Fall with injury? No   Number of falls in past 12 months 1   Fall Risk Score 1        Abuse Screening:   :       Abuse Screening Questionnaire 11/2/2020 7/19/2019 7/23/2018 2/21/2017 2/16/2016   Do you ever feel afraid of your partner? N N N N N   Are you in a relationship with someone who physically or mentally threatens you? N N N N N   Is it safe for you to go home?  Y Y Y Y Y        ADL Screening:   :       ADL Assessment 7/23/2018   Feeding yourself No Help Needed   Getting from bed to chair No Help Needed   Getting dressed No Help Needed   Bathing or showering No Help Needed   Walk across the room (includes cane/walker) No Help Needed   Using the telphone No Help Needed   Taking your medications No Help Needed   Preparing meals No Help Needed   Managing money (expenses/bills) No Help Needed   Moderately strenuous housework (laundry) No Help Needed   Shopping for personal items (toiletries/medicines) No Help Needed   Shopping for groceries No Help Needed   Driving No Help Needed   Climbing a flight of stairs No Help Needed   Getting to places beyond walking distances No Help Needed
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
## 2020-11-02 NOTE — PATIENT INSTRUCTIONS
Medicare Wellness Visit, Female The best way to live healthy is to have a lifestyle where you eat a well-balanced diet, exercise regularly, limit alcohol use, and quit all forms of tobacco/nicotine, if applicable. Regular preventive services are another way to keep healthy. Preventive services (vaccines, screening tests, monitoring & exams) can help personalize your care plan, which helps you manage your own care. Screening tests can find health problems at the earliest stages, when they are easiest to treat. [facility] follows the current, evidence-based guidelines published by the [facility] Mat[facility] ([facility]) when recommending preventive services for our patients. Because we follow these guidelines, sometimes recommendations change over time as research supports it. (For example, mammograms used to be recommended annually. Even though Medicare will still pay for an annual mammogram, the newer guidelines recommend a mammogram every two years for women of average risk). Of course, you and your doctor may decide to screen more often for some diseases, based on your risk and your co-morbidities (chronic disease you are already diagnosed with). Preventive services for you include: - Medicare offers their members a free annual wellness visit, which is time for you and your primary care provider to discuss and plan for your preventive service needs. Take advantage of this benefit every year! 
-All adults over the age of 72 should receive the recommended pneumonia vaccines. Current USPSTF guidelines recommend a series of two vaccines for the best pneumonia protection.  
-All adults should have a flu vaccine yearly and a tetanus vaccine every 10 years.  
-All adults age 48 and older should receive the shingles vaccines (series of two vaccines). -All adults age 38-68 who are overweight should have a diabetes screening test once every three years. -All adults born between [birth year] and 1965 should be screened once for Hepatitis C. 
-Other screening tests and preventive services for persons with diabetes include: an eye exam to screen for diabetic retinopathy, a kidney function test, a foot exam, and stricter control over your cholesterol.  
-Cardiovascular screening for adults with routine risk involves an electrocardiogram (ECG) at intervals determined by your doctor.  
-Colorectal cancer screenings should be done for adults age 54-65 with no increased risk factors for colorectal cancer. There are a number of acceptable methods of screening for this type of cancer. Each test has its own benefits and drawbacks. Discuss with your doctor what is most appropriate for you during your annual wellness visit. The different tests include: colonoscopy (considered the best screening method), a fecal occult blood test, a fecal DNA test, and sigmoidoscopy. 
 
-A bone mass density test is recommended when a woman turns 65 to screen for osteoporosis. This test is only recommended one time, as a screening. Some providers will use this same test as a disease monitoring tool if you already have osteoporosis. -Breast cancer screenings are recommended every other year for women of normal risk, age 54-69. 
-Cervical cancer screenings for women over age 72 are only recommended with certain risk factors. Here is a list of your current Health Maintenance items (your personalized list of preventive services) with a due date: 
Health Maintenance Due Topic Date Due  Shingles Vaccine (1 of 2) 07/04/2002  Pneumococcal Vaccine (1 of 1 - PPSV23) 07/04/2017  
 DTaP/Tdap/Td  (2 - Td) 10/08/2018 [address] [place] [address] Annual Well Visit  07/19/2020

## 2020-11-02 NOTE — LETTER
Akhil Munguia MD  
 
 
 
11/2/2020 Patient: Evelyne Donahue YOB: 1950 Date of Last Visit: 7/19/2019 To Whom It May Concern: It is my medical opinion that Evelyne Donahue has a medical condition that prevents them from safely wearing a face mask. The patient should practice self-isolation or social distancing whenever possible to reduce the risk of acquiring or transmitting COVID-19. If you have any questions or concerns, please don't hesitate to contact me. Sincerely, Akhil Munguia MD

## 2021-01-21 DIAGNOSIS — E03.9 HYPOTHYROIDISM, UNSPECIFIED TYPE: ICD-10-CM

## 2021-01-21 RX ORDER — LEVOTHYROXINE SODIUM 150 UG/1
TABLET ORAL
Qty: 90 TAB | Refills: 0 | Status: SHIPPED | OUTPATIENT
Start: 2021-01-21 | End: 2021-04-22

## 2021-04-22 DIAGNOSIS — E03.9 HYPOTHYROIDISM, UNSPECIFIED TYPE: ICD-10-CM

## 2021-04-22 RX ORDER — LEVOTHYROXINE SODIUM 150 UG/1
TABLET ORAL
Qty: 90 TAB | Refills: 0 | Status: SHIPPED | OUTPATIENT
Start: 2021-04-22 | End: 2021-07-29

## 2021-07-29 DIAGNOSIS — E03.9 HYPOTHYROIDISM, UNSPECIFIED TYPE: ICD-10-CM

## 2021-07-29 RX ORDER — LEVOTHYROXINE SODIUM 150 UG/1
TABLET ORAL
Qty: 90 TABLET | Refills: 0 | Status: SHIPPED | OUTPATIENT
Start: 2021-07-29 | End: 2021-10-25

## 2021-10-25 DIAGNOSIS — E03.9 HYPOTHYROIDISM, UNSPECIFIED TYPE: ICD-10-CM

## 2021-10-25 RX ORDER — LEVOTHYROXINE SODIUM 150 UG/1
TABLET ORAL
Qty: 90 TABLET | Refills: 0 | Status: SHIPPED | OUTPATIENT
Start: 2021-10-25

## 2021-11-03 ENCOUNTER — VIRTUAL VISIT (OUTPATIENT)
Dept: INTERNAL MEDICINE CLINIC | Age: 71
End: 2021-11-03

## 2021-11-03 DIAGNOSIS — R05.9 COUGH: Primary | ICD-10-CM

## 2021-11-03 RX ORDER — ASCORBIC ACID 250 MG
TABLET ORAL
COMMUNITY

## 2021-11-03 NOTE — PROGRESS NOTES
[facility]  Identified pt with two pt identifiers(name and ). Chief Complaint   Patient presents with    Sinus Infection     Cough, head fullness. Has been taking Mucinex, Zyrtec, Robitussin with no relief. Reviewed record In preparation for visit and have obtained necessary documentation. 1. Have you been to the ER, urgent care clinic or hospitalized since your last visit? No     2. Have you seen or consulted any other health care providers outside of the [address] [place] [address] since your last visit? Include any pap smears or colon screening. No    Patient does not have an advance directive. Vitals reviewed with provider. Health Maintenance reviewed:     Health Maintenance Due   Topic    Pneumococcal 65+ years (1 of 1 - PPSV23)    DTaP/Tdap/Td series (2 - Td or Tdap)    Breast Cancer Screen Mammogram     Flu Vaccine (1)    Medicare Yearly Exam           Wt Readings from Last 3 Encounters:   20 192 lb (87.1 kg)   19 187 lb 9.6 oz (85.1 kg)   19 181 lb (82.1 kg)        Temp Readings from Last 3 Encounters:   20 98.2 °F (36.8 °C) (Oral)   19 97.8 °F (36.6 °C) (Oral)   19 98 °F (36.7 °C) (Oral)        BP Readings from Last 3 Encounters:   20 132/80   19 148/90   19 142/88        Pulse Readings from Last 3 Encounters:   20 63   19 62   19 78      There were no vitals filed for this visit.        Learning Assessment:   :       Learning Assessment 3/8/2017 3/14/2016 12/3/2014   PRIMARY LEARNER Patient Patient Patient   HIGHEST LEVEL OF EDUCATION - PRIMARY LEARNER  - - > 4 YEARS OF COLLEGE   BARRIERS PRIMARY LEARNER NONE - NONE   CO-LEARNER CAREGIVER No - -   PRIMARY LANGUAGE ENGLISH ENGLISH ENGLISH    NEED - - No   LEARNER PREFERENCE PRIMARY DEMONSTRATION LISTENING DEMONSTRATION   LEARNING SPECIAL TOPICS no no -   ANSWERED BY patient patient patient   RELATIONSHIP SELF SELF SELF   ASSESSMENT COMMENT none none -        Depression Screening:   :       3 most recent PHQ Screens 11/2/2020   Little interest or pleasure in doing things Not at all   Feeling down, depressed, irritable, or hopeless Not at all   Total Score PHQ 2 0        Fall Risk Assessment:   :       Fall Risk Assessment, last 12 mths 11/2/2020   Able to walk? Yes   Fall in past 12 months? Yes   Number of falls in past 12 months 1   Fall with injury? 0        Abuse Screening:   :       Abuse Screening Questionnaire 11/2/2020 7/19/2019 7/23/2018 2/21/2017 2/16/2016   Do you ever feel afraid of your partner? N N N N N   Are you in a relationship with someone who physically or mentally threatens you? N N N N N   Is it safe for you to go home?  Y Y Y Y Y        ADL Screening:   :       ADL Assessment 7/23/2018   Feeding yourself No Help Needed   Getting from bed to chair No Help Needed   Getting dressed No Help Needed   Bathing or showering No Help Needed   Walk across the room (includes cane/walker) No Help Needed   Using the telphone No Help Needed   Taking your medications No Help Needed   Preparing meals No Help Needed   Managing money (expenses/bills) No Help Needed   Moderately strenuous housework (laundry) No Help Needed   Shopping for personal items (toiletries/medicines) No Help Needed   Shopping for groceries No Help Needed   Driving No Help Needed   Climbing a flight of stairs No Help Needed   Getting to places beyond walking distances No Help Needed

## 2021-11-03 NOTE — PROGRESS NOTES
Shanika Kennedy is a 73 y.o. female, evaluated via audio-only technology on 11/3/2021 for Sinus Infection (Cough, head fullness. Has been taking Mucinex, Zyrtec, Robitussin with no relief. )      Assessment & Plan:   Diagnoses and all orders for this visit:    1. Cough          Patient became very upset speaking with me about the fact that she couldn't be seen in person for respiratory symptoms and that she was asked to wear a mask in the office when she has letter exempting her (though she is ill today). Also explained if ill and not wearing mask is potentially harmful to others. I attempted to explain that this office is not equipped with the space and PPE for potential covid-19 cases. Despite my speaking to her about the possible diagnoses and ways to evaluate and treat she didn't want to continue visit. Recommend she be seen in a facility capable of treating potential covid-19 cases including urgent care clinics. If she decides not to be treated then she should quarantine at least 10 days from when symptoms started AND improving. 12    patie  Subjective:   Patient was scheduled for AWV and walked into office with respiratory symptoms for 7 days and visit was changed to VV. Has been exposed to RSV, didn't get covid-19 vaccine. Complains of stuffy head, coughing, no sinus pressure. No sore throat, no sob or wheezing, +postnasal drip  No f/c  No n/v/diarrhea  Symptoms have stabilized but not getting better. Hasn't been evaluated for symptoms yet. Prior to Admission medications    Medication Sig Start Date End Date Taking? Authorizing Provider   ascorbic acid, vitamin C, (Vitamin C) 250 mg tablet Take  by mouth. Yes Provider, Historical   ZINC ACETATE PO Take  by mouth.    Yes Provider, Historical   levothyroxine (SYNTHROID) 150 mcg tablet TAKE ONE TABLET ONCE DAILY  BEFORE BREAKFAST 10/25/21  Yes Paola Jones MD   alendronate (FOSAMAX) 70 mg tablet Take 1 Tab by mouth every seven (7) days. 11/2/20  Yes Sierra Guzman MD   EPINEPHrine (EPIPEN) 0.3 mg/0.3 mL injection 0.3 mL by IntraMUSCular route once as needed for up to 1 dose. 7/25/16  Yes Sierra Guzman MD   chlorpheniramine (CHLOR-TRIMETRON) 4 mg tablet Take 4 mg by mouth every six (6) hours as needed for Allergies. Yes Provider, Historical   diclofenac (VOLTAREN) 1 % topical gel Apply 2 g to affected area four (4) times daily as needed for Pain. 12/3/14  Yes Sierra Guzman MD   multivitamins-ca-iron-minerals (ONE-A-DAY WOMENS FORMULA) 27-0.4 mg Tab Take  by mouth. Yes Provider, Historical   cholecalciferol, vitamin d3, (VITAMIN D) 1,000 unit tablet Take 1,000 Units by mouth daily. Yes Provider, Historical   calcium-vitamin D (OYSTER SHELL CALCIUM-VIT D3) 250-125 mg-unit tablet Take 1 Tab by mouth daily. Yes Provider, Historical         ROS    No flowsheet data found. Funmilayo Bales, who was evaluated through a patient-initiated, synchronous (real-time) audio only encounter, and/or her healthcare decision maker, is aware that it is a billable service, with coverage as determined by her insurance carrier. She provided verbal consent to proceed: Yes. She has not had a related appointment within my department in the past 7 days or scheduled within the next 24 hours.         Jesus Manuel Conte MD

## 2022-03-18 PROBLEM — R03.0 WHITE COAT SYNDROME WITHOUT DIAGNOSIS OF HYPERTENSION: Status: ACTIVE | Noted: 2019-07-19

## 2022-03-20 PROBLEM — M81.0 AGE-RELATED OSTEOPOROSIS WITHOUT CURRENT PATHOLOGICAL FRACTURE: Status: ACTIVE | Noted: 2019-09-16

## 2023-03-23 ENCOUNTER — TRANSCRIBE ORDER (OUTPATIENT)
Dept: SCHEDULING | Age: 73
End: 2023-03-23

## 2023-03-23 DIAGNOSIS — M81.0 AGE-RELATED OSTEOPOROSIS WITHOUT CURRENT PATHOLOGICAL FRACTURE: Primary | ICD-10-CM

## 2023-04-22 ENCOUNTER — TRANSCRIBE ORDERS (OUTPATIENT)
Facility: HOSPITAL | Age: 73
End: 2023-04-22

## 2023-04-22 DIAGNOSIS — M81.0 AGE RELATED OSTEOPOROSIS, UNSPECIFIED PATHOLOGICAL FRACTURE PRESENCE: Primary | ICD-10-CM

## 2023-04-23 DIAGNOSIS — M81.0 AGE-RELATED OSTEOPOROSIS WITHOUT CURRENT PATHOLOGICAL FRACTURE: Primary | ICD-10-CM

## 2023-06-06 ENCOUNTER — HOSPITAL ENCOUNTER (OUTPATIENT)
Facility: HOSPITAL | Age: 73
Discharge: HOME | End: 2023-06-09
Payer: MEDICARE

## 2023-06-06 DIAGNOSIS — M81.0 AGE RELATED OSTEOPOROSIS, UNSPECIFIED PATHOLOGICAL FRACTURE PRESENCE: ICD-10-CM

## 2023-06-06 PROCEDURE — 77080 DXA BONE DENSITY AXIAL: CPT

## 2025-06-14 ENCOUNTER — HOSPITAL ENCOUNTER (OUTPATIENT)
Facility: HOSPITAL | Age: 75
Discharge: HOME | End: 2025-06-17
Payer: MEDICARE

## 2025-06-14 DIAGNOSIS — M81.0 SENILE OSTEOPOROSIS: ICD-10-CM

## 2025-06-14 PROCEDURE — 77080 DXA BONE DENSITY AXIAL: CPT
